# Patient Record
Sex: FEMALE | Race: WHITE
[De-identification: names, ages, dates, MRNs, and addresses within clinical notes are randomized per-mention and may not be internally consistent; named-entity substitution may affect disease eponyms.]

---

## 2020-06-25 ENCOUNTER — HOSPITAL ENCOUNTER (EMERGENCY)
Dept: HOSPITAL 8 - ED | Age: 20
Discharge: HOME | End: 2020-06-25
Payer: COMMERCIAL

## 2020-06-25 VITALS — DIASTOLIC BLOOD PRESSURE: 66 MMHG | SYSTOLIC BLOOD PRESSURE: 118 MMHG

## 2020-06-25 VITALS — HEIGHT: 66 IN | BODY MASS INDEX: 17.72 KG/M2 | WEIGHT: 110.23 LBS

## 2020-06-25 DIAGNOSIS — R51: ICD-10-CM

## 2020-06-25 DIAGNOSIS — R07.89: Primary | ICD-10-CM

## 2020-06-25 DIAGNOSIS — R00.2: ICD-10-CM

## 2020-06-25 LAB
ALBUMIN SERPL-MCNC: 4.3 G/DL (ref 3.4–5)
ALP SERPL-CCNC: 67 U/L (ref 45–117)
ALT SERPL-CCNC: 25 U/L (ref 12–78)
ANION GAP SERPL CALC-SCNC: 6 MMOL/L (ref 5–15)
BASOPHILS # BLD AUTO: 0.02 X10^3/UL (ref 0–0.1)
BASOPHILS NFR BLD AUTO: 0 % (ref 0–1)
BILIRUB SERPL-MCNC: 0.2 MG/DL (ref 0.2–1)
CALCIUM SERPL-MCNC: 9.4 MG/DL (ref 8.5–10.1)
CHLORIDE SERPL-SCNC: 104 MMOL/L (ref 98–107)
CREAT SERPL-MCNC: 0.79 MG/DL (ref 0.55–1.02)
EOSINOPHIL # BLD AUTO: 0.02 X10^3/UL (ref 0–0.4)
EOSINOPHIL NFR BLD AUTO: 0 % (ref 1–7)
ERYTHROCYTE [DISTWIDTH] IN BLOOD BY AUTOMATED COUNT: 12.8 % (ref 9.6–15.2)
LYMPHOCYTES # BLD AUTO: 1.2 X10^3/UL (ref 1–3.4)
LYMPHOCYTES NFR BLD AUTO: 27 % (ref 22–44)
MCH RBC QN AUTO: 31.8 PG (ref 27–34.8)
MCHC RBC AUTO-ENTMCNC: 33.3 G/DL (ref 32.4–35.8)
MCV RBC AUTO: 95.6 FL (ref 80–100)
MD: NO
MONOCYTES # BLD AUTO: 0.38 X10^3/UL (ref 0.2–0.8)
MONOCYTES NFR BLD AUTO: 9 % (ref 2–9)
NEUTROPHILS # BLD AUTO: 2.75 X10^3/UL (ref 1.8–6.8)
NEUTROPHILS NFR BLD AUTO: 63 % (ref 42–75)
PLATELET # BLD AUTO: 225 X10^3/UL (ref 130–400)
PMV BLD AUTO: 9.2 FL (ref 7.4–10.4)
PROT SERPL-MCNC: 8.2 G/DL (ref 6.4–8.2)
RBC # BLD AUTO: 4.5 X10^6/UL (ref 3.82–5.3)
T4 FREE SERPL-MCNC: 1.02 NG/DL (ref 0.76–1.46)

## 2020-06-25 PROCEDURE — 93005 ELECTROCARDIOGRAM TRACING: CPT

## 2020-06-25 PROCEDURE — 80053 COMPREHEN METABOLIC PANEL: CPT

## 2020-06-25 PROCEDURE — 99284 EMERGENCY DEPT VISIT MOD MDM: CPT

## 2020-06-25 PROCEDURE — 84439 ASSAY OF FREE THYROXINE: CPT

## 2020-06-25 PROCEDURE — 85025 COMPLETE CBC W/AUTO DIFF WBC: CPT

## 2020-06-25 PROCEDURE — 84443 ASSAY THYROID STIM HORMONE: CPT

## 2020-06-25 PROCEDURE — 36415 COLL VENOUS BLD VENIPUNCTURE: CPT

## 2021-10-17 ENCOUNTER — HOSPITAL ENCOUNTER (EMERGENCY)
Facility: MEDICAL CENTER | Age: 21
End: 2021-10-17
Attending: EMERGENCY MEDICINE | Admitting: EMERGENCY MEDICINE
Payer: OTHER MISCELLANEOUS

## 2021-10-17 VITALS
BODY MASS INDEX: 16.01 KG/M2 | RESPIRATION RATE: 18 BRPM | HEART RATE: 88 BPM | DIASTOLIC BLOOD PRESSURE: 62 MMHG | SYSTOLIC BLOOD PRESSURE: 121 MMHG | OXYGEN SATURATION: 98 % | WEIGHT: 99.65 LBS | HEIGHT: 66 IN | TEMPERATURE: 98.2 F

## 2021-10-17 DIAGNOSIS — R00.2 PALPITATIONS: ICD-10-CM

## 2021-10-17 DIAGNOSIS — F41.1 ANXIETY REACTION: ICD-10-CM

## 2021-10-17 LAB — EKG IMPRESSION: NORMAL

## 2021-10-17 PROCEDURE — 93005 ELECTROCARDIOGRAM TRACING: CPT | Performed by: EMERGENCY MEDICINE

## 2021-10-17 PROCEDURE — 93005 ELECTROCARDIOGRAM TRACING: CPT

## 2021-10-17 PROCEDURE — 99283 EMERGENCY DEPT VISIT LOW MDM: CPT

## 2021-10-17 ASSESSMENT — LIFESTYLE VARIABLES: DO YOU DRINK ALCOHOL: NO

## 2021-10-17 NOTE — Clinical Note
Salvador was seen and treated in our emergency department on 10/17/2021.  She may return to school on 10/18/2021.  Patient was in the emergency department the night of 10/17/2021.  Please excuse her from the related school assignments.    If you have any questions or concerns, please don't hesitate to call.      Nova Mendez M.D.
Salvador was seen and treated in our emergency department on 10/17/2021.  She may return to school on 10/18/2021.  Patient was in the emergency department the night of 10/17/2021.  Please excuse her from the related school assignments.    If you have any questions or concerns, please don't hesitate to call.      Nova Mendez M.D.
Normal

## 2021-10-18 NOTE — ED PROVIDER NOTES
ED Provider Note    Scribed for Nova Mendez M.D. by Carrol Lima. 10/17/2021  7:03 PM    Means of arrival: Walk in  History obtained from: patient   History limited by: none      CHIEF COMPLAINT  Chief Complaint   Patient presents with   • Anxiety     pt c/o feeling anxious after yawning and feeling a pop in her throat. pt states she was able to come herself down but then it came back while driving and her throat felt numb    • Rapid Heart Beat     hst of 1 pervious panic attack        HPI  Lizbet Franco is a 21 y.o. female who presents to the Emergency Department for anxiety onset around 4:30 PM. Patient describes she yawned felt a pop in her throat that made her feel like she wasn't able to breath so she started to panic. Patient states she continued to have anxiety and stress throughout the next few hours. She has associated tingling in hands and feet, lightheadedness, palpitations, shortness of breath, and hyperventilation. Patient is feeling improved at this time, but notes some continuing mild pain in her throat where she felt the pop.  All of her other symptoms have resolved.  Denies fever, cough, or chest pain. She has a history of 1 panic attack but states it only lasted around 3 minutes.  She denies chance of pregnancy.  No drug or alcohol use today.    REVIEW OF SYSTEMS  Pertinent positive include anxiety, tingling in hands and feet, lightheadedness, palpitations, shortness of breath, and hyperventilation. Pertinent negative include fever, cough, or chest pain. All other systems reviewed and are negative.    PAST MEDICAL HISTORY   None pertinent    SOCIAL HISTORY  Social History     Tobacco Use   • Smoking status: Never Smoker   • Smokeless tobacco: Never Used   Vaping Use   • Vaping Use: Never used   Substance and Sexual Activity   • Alcohol use: Yes   • Drug use: Yes     Comment: thc       SURGICAL HISTORY  patient denies any surgical history    CURRENT MEDICATIONS  Home Medications   "   Reviewed by June Green R.N. (Registered Nurse) on 10/17/21 at 1754  Med List Status: Complete   Medication Last Dose Status        Patient Mamadou Taking any Medications                       ALLERGIES  No Known Allergies    PHYSICAL EXAM   VITAL SIGNS: /75   Pulse (!) 107   Temp 37.1 °C (98.7 °F) (Temporal)   Resp 16   Ht 1.676 m (5' 6\")   Wt 45.2 kg (99 lb 10.4 oz)   LMP 10/10/2021   SpO2 96%   BMI 16.08 kg/m²    Constitutional: Nontoxic appearing young female.  Alert in no apparent distress.  HENT: Normocephalic, Atraumatic. Bilateral external ears normal. Nose normal.  Moist mucous membranes.  Oropharynx clear.  No palpable lymphadenopathy.  Eyes: Pupils are equal and reactive. Conjunctiva normal.   Neck: Supple, full range of motion  Heart: Regular rate and rhythm.  No murmurs.    Lungs: No respiratory distress, normal work of breathing. Lungs clear to auscultation bilaterally.  Abdomen Soft, no distention.  No tenderness to palpation.  Musculoskeletal: Atraumatic. No obvious deformities noted.  No lower extremity edema.  Skin: Warm, Dry.  No erythema, No rash.   Neurologic: Alert and oriented x3. Moving all extremities spontaneously without focal deficits.  Psychiatric: Affect normal, Mood normal, Appears appropriate and not intoxicated.    ED COURSE  Vitals:    10/17/21 1745 10/17/21 1750 10/17/21 1920   BP: 129/75  121/62   Pulse: (!) 107  88   Resp: 16  18   Temp: 37.1 °C (98.7 °F)  36.8 °C (98.2 °F)   TempSrc: Temporal  Tympanic   SpO2: 96%  98%   Weight:  45.2 kg (99 lb 10.4 oz)    Height: 1.676 m (5' 6\")         MEDICAL DECISION MAKING  7:03 PM Patient seen and examined at bedside. The patient presents with anxiety and throat pain. I informed the patient that her symptoms are likely related to a panic attack and anxiety. I discussed that she may have pulled a muscle while yawning causing the pain in her throat. Her exam is normal and reassuring.  No concern for vascular dissection, " infectious process, acute coronary syndrome syndrome or pulmonary embolism.  Patients vitals are stable as well. I discussed treating her with medication for anxiety, however the patient declined. Discussed ways to help decrease anxiety. Discussed return precautions. Patient will be discharged at this time. She verbalizes agreement with discharge and plan of care.        The patient will return for new or worsening symptoms and is stable at the time of discharge.    DISPOSITION:  Patient will be discharged home in stable condition.    FOLLOW UP:  [facility]  [address] [place] [address]  [facility] [address]  [phone]  Schedule an appointment as soon as possible for a visit       [facility], Emergency Dept  [address] [address] [phone]    If symptoms worsen      IMPRESSION  (F41.1) Anxiety reaction  (R00.2) Palpitations    Results, diagnoses, and treatment options were discussed with the patient and/or family. Patient verbalized understanding of plan of care.     ICarrol (Keithibe), am scribing for, and in the presence of, Nova Mendez M.D..    Electronically signed by: Carrol Lima (Keithibmaddison), 10/17/2021    INova M.D. personally performed the services described in this documentation, as scribed by Carrol Lima in my presence, and it is both accurate and complete. E    The note accurately reflects work and decisions made by me.  Nova Mendez M.D.  10/17/2021  8:48 PM

## 2021-10-18 NOTE — ED NOTES
Pt ambulated to yellow 66, provided with gown to change, placed monitor.   Pt said that she just feel pain right lower jaw area

## 2021-10-18 NOTE — DISCHARGE INSTRUCTIONS
You were seen in the Emergency Department for likely anxiety reaction, throat pain.    EKG were completed without significant acute abnormalities.    Please use 1,000mg of tylenol or 600mg of ibuprofen every 6 hours as needed for pain.    Please follow up with your primary care physician.    Return to the Emergency Department with chest pain, trouble breathing, fainting, or other concerns.

## 2021-10-18 NOTE — ED TRIAGE NOTES
Pt to triage .  Chief Complaint   Patient presents with   • Anxiety     pt c/o feeling anxious after yawning and feeling a pop in her throat. pt states she was able to come herself down but then it came back while driving and her throat felt numb    • Rapid Heart Beat     hst of 1 pervious panic attack

## 2023-09-13 ENCOUNTER — APPOINTMENT (RX ONLY)
Dept: URBAN - METROPOLITAN AREA CLINIC 4 | Facility: CLINIC | Age: 23
Setting detail: DERMATOLOGY
End: 2023-09-13

## 2023-09-13 DIAGNOSIS — D36.1 BENIGN NEOPLASM OF PERIPHERAL NERVES AND AUTONOMIC NERVOUS SYSTEM: ICD-10-CM

## 2023-09-13 PROBLEM — D36.14 BENIGN NEOPLASM OF PERIPHERAL NERVES AND AUTONOMIC NERVOUS SYSTEM OF THORAX: Status: ACTIVE | Noted: 2023-09-13

## 2023-09-13 PROBLEM — D48.5 NEOPLASM OF UNCERTAIN BEHAVIOR OF SKIN: Status: ACTIVE | Noted: 2023-09-13

## 2023-09-13 PROCEDURE — 11102 TANGNTL BX SKIN SINGLE LES: CPT

## 2023-09-13 PROCEDURE — 99202 OFFICE O/P NEW SF 15 MIN: CPT | Mod: 25

## 2023-09-13 PROCEDURE — ? BIOPSY BY SHAVE METHOD

## 2023-09-13 PROCEDURE — ? DIAGNOSIS COMMENT

## 2023-09-13 PROCEDURE — ? COUNSELING

## 2023-09-13 ASSESSMENT — LOCATION SIMPLE DESCRIPTION DERM: LOCATION SIMPLE: RIGHT UPPER BACK

## 2023-09-13 ASSESSMENT — LOCATION ZONE DERM: LOCATION ZONE: TRUNK

## 2023-09-13 ASSESSMENT — LOCATION DETAILED DESCRIPTION DERM: LOCATION DETAILED: RIGHT SUPERIOR UPPER BACK
